# Patient Record
Sex: MALE | Race: AMERICAN INDIAN OR ALASKA NATIVE
[De-identification: names, ages, dates, MRNs, and addresses within clinical notes are randomized per-mention and may not be internally consistent; named-entity substitution may affect disease eponyms.]

---

## 2017-09-02 ENCOUNTER — HOSPITAL ENCOUNTER (EMERGENCY)
Dept: HOSPITAL 42 - ED | Age: 32
Discharge: LEFT BEFORE DISCHARGE | End: 2017-09-02
Payer: COMMERCIAL

## 2017-09-02 VITALS
SYSTOLIC BLOOD PRESSURE: 143 MMHG | RESPIRATION RATE: 18 BRPM | DIASTOLIC BLOOD PRESSURE: 97 MMHG | HEART RATE: 90 BPM | TEMPERATURE: 97.9 F | OXYGEN SATURATION: 97 %

## 2017-09-02 VITALS — BODY MASS INDEX: 27.3 KG/M2

## 2017-09-02 DIAGNOSIS — F10.129: ICD-10-CM

## 2017-09-02 DIAGNOSIS — Z23: ICD-10-CM

## 2017-09-02 DIAGNOSIS — Z00.00: Primary | ICD-10-CM
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
## 2017-12-11 ENCOUNTER — HOSPITAL ENCOUNTER (EMERGENCY)
Dept: HOSPITAL 42 - ED | Age: 32
Discharge: HOME | End: 2017-12-11
Payer: COMMERCIAL

## 2017-12-11 VITALS
DIASTOLIC BLOOD PRESSURE: 94 MMHG | RESPIRATION RATE: 18 BRPM | TEMPERATURE: 98.2 F | HEART RATE: 77 BPM | SYSTOLIC BLOOD PRESSURE: 139 MMHG | OXYGEN SATURATION: 100 %

## 2017-12-11 VITALS — BODY MASS INDEX: 27.3 KG/M2
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
## 2017-12-11 DIAGNOSIS — F17.210: ICD-10-CM

## 2017-12-11 DIAGNOSIS — S16.1XXA: Primary | ICD-10-CM

## 2017-12-11 PROCEDURE — 72050 X-RAY EXAM NECK SPINE 4/5VWS: CPT

## 2017-12-11 PROCEDURE — 99283 EMERGENCY DEPT VISIT LOW MDM: CPT

## 2017-12-11 PROCEDURE — 96372 THER/PROPH/DIAG INJ SC/IM: CPT
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
## 2017-12-12 NOTE — RAD
PROCEDURE:  Cervical Spine Radiographs.



HISTORY:

Pain. 



COMPARISON:

None. 



FINDINGS:



BONES:

Alignment maintained. No fracture.  Dens Intact. 



DISC SPACES:

Normal. 



SOFT TISSUES:

Normal. No prevertebral soft tissue swelling. 



OTHER FINDINGS:

None.



IMPRESSION:

Normal cervical spine radiographs

## 2019-02-23 ENCOUNTER — HOSPITAL ENCOUNTER (EMERGENCY)
Dept: HOSPITAL 42 - ED | Age: 34
LOS: 1 days | Discharge: HOME | End: 2019-02-24
Payer: SELF-PAY

## 2019-02-23 VITALS — BODY MASS INDEX: 25.8 KG/M2

## 2019-02-23 VITALS — RESPIRATION RATE: 18 BRPM | TEMPERATURE: 97.6 F

## 2019-02-23 DIAGNOSIS — R05: Primary | ICD-10-CM

## 2019-02-23 DIAGNOSIS — Z59.0: ICD-10-CM

## 2019-02-23 DIAGNOSIS — F17.210: ICD-10-CM

## 2019-02-23 PROCEDURE — 71045 X-RAY EXAM CHEST 1 VIEW: CPT

## 2019-02-23 PROCEDURE — 99282 EMERGENCY DEPT VISIT SF MDM: CPT

## 2019-02-23 SDOH — ECONOMIC STABILITY - HOUSING INSECURITY: HOMELESSNESS: Z59.0

## 2019-02-24 VITALS — SYSTOLIC BLOOD PRESSURE: 148 MMHG | HEART RATE: 93 BPM | OXYGEN SATURATION: 96 % | DIASTOLIC BLOOD PRESSURE: 96 MMHG
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
## 2019-02-24 NOTE — RAD
Date of service: 



02/24/2019



PROCEDURE:  CHEST RADIOGRAPH, 1 VIEW



HISTORY:

cough



COMPARISON:

None available.



FINDINGS:



LUNGS:

The lungs are well inflated and clear.



PLEURA:

No pneumothorax or pleural effusion.



CARDIOVASCULAR:

The heart is normal in size.  No aortic atherosclerotic 

calcifications present. 



OSSEOUS STRUCTURES:

Within normal limits for the patient's age.



VISUALIZED UPPER ABDOMEN:

Normal.



OTHER FINDINGS:

None. 



IMPRESSION:

No active pulmonary disease.

## 2021-03-02 ENCOUNTER — EMERGENCY (EMERGENCY)
Facility: HOSPITAL | Age: 36
LOS: 1 days | Discharge: HOME | End: 2021-03-02
Admitting: EMERGENCY MEDICINE
Payer: MEDICARE

## 2021-03-02 VITALS
RESPIRATION RATE: 18 BRPM | DIASTOLIC BLOOD PRESSURE: 80 MMHG | SYSTOLIC BLOOD PRESSURE: 121 MMHG | OXYGEN SATURATION: 100 % | HEIGHT: 68 IN | TEMPERATURE: 98 F | HEART RATE: 80 BPM | WEIGHT: 179.9 LBS

## 2021-03-02 DIAGNOSIS — M79.672 PAIN IN LEFT FOOT: ICD-10-CM

## 2021-03-02 DIAGNOSIS — M79.671 PAIN IN RIGHT FOOT: ICD-10-CM
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
## 2021-03-02 DIAGNOSIS — S60.511A ABRASION OF RIGHT HAND, INITIAL ENCOUNTER: ICD-10-CM
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
## 2021-03-02 PROCEDURE — 99283 EMERGENCY DEPT VISIT LOW MDM: CPT

## 2021-03-02 RX ORDER — IBUPROFEN 200 MG
600 TABLET ORAL ONCE
Refills: 0 | Status: COMPLETED | OUTPATIENT
Start: 2021-03-02 | End: 2021-03-02

## 2021-03-02 RX ADMIN — Medication 600 MILLIGRAM(S): at 05:01

## 2021-03-02 NOTE — ED PROVIDER NOTE - CLINICAL SUMMARY MEDICAL DECISION MAKING FREE TEXT BOX
pt p/w chronic b/l foot pain, no associated trauma, exam with no secondary infection noted, NV intact, offered NSAID for pain control, steady gait and ambulatory, medically stable for dc

## 2021-03-02 NOTE — ED PROVIDER NOTE - PATIENT PORTAL LINK FT
You can access the FollowMyHealth Patient Portal offered by [facility] by registering at the following website: [email or web address]. By joining [employer]’s FollowMyHealth portal, you will also be able to view your health information using other applications (apps) compatible with our system.
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
## 2021-03-02 NOTE — ED PROVIDER NOTE - PHYSICAL EXAMINATION
Gen - Unkempt M, NAD, comfortable and non-toxic appearing  Skin - warm, dry, mild abrasion to R hand, no laceration or dc, no FB noted    HEENT - AT/NC, airway patent, neck supple   CV - S1S2, R/R/R  Resp - CTAB, no r/r/w  GI - soft, ND, NT, no CVAT b/l   MS - w/w/p, no c/c/e, TTP to b/l sole region, no erythema/edema/ecchymosis/crepitus, symmetric distal pulses b/l, compartment soft, calve supple, NT   Neuro - AxOx3, ambulatory with steady gait

## 2021-03-02 NOTE — ED ADULT TRIAGE NOTE - CHIEF COMPLAINT QUOTE
Pt walks in c/o chronic B/L foot pain for 3 years. Pt denies any recent injury. Pt ambulating w/o difficulty.